# Patient Record
Sex: FEMALE | Race: WHITE | NOT HISPANIC OR LATINO | Employment: OTHER | URBAN - METROPOLITAN AREA
[De-identification: names, ages, dates, MRNs, and addresses within clinical notes are randomized per-mention and may not be internally consistent; named-entity substitution may affect disease eponyms.]

---

## 2022-07-25 ENCOUNTER — TELEMEDICINE (OUTPATIENT)
Dept: FAMILY MEDICINE CLINIC | Facility: CLINIC | Age: 72
End: 2022-07-25
Payer: COMMERCIAL

## 2022-07-25 VITALS — HEART RATE: 89 BPM | OXYGEN SATURATION: 98 % | TEMPERATURE: 99.2 F

## 2022-07-25 DIAGNOSIS — U07.1 COVID-19 VIRUS INFECTION: Primary | ICD-10-CM

## 2022-07-25 DIAGNOSIS — J01.90 ACUTE NON-RECURRENT SINUSITIS, UNSPECIFIED LOCATION: ICD-10-CM

## 2022-07-25 LAB
SARS-COV-2 AG UPPER RESP QL IA: POSITIVE
VALID CONTROL: ABNORMAL

## 2022-07-25 PROCEDURE — 87811 SARS-COV-2 COVID19 W/OPTIC: CPT | Performed by: NURSE PRACTITIONER

## 2022-07-25 PROCEDURE — 1160F RVW MEDS BY RX/DR IN RCRD: CPT | Performed by: NURSE PRACTITIONER

## 2022-07-25 PROCEDURE — 99203 OFFICE O/P NEW LOW 30 MIN: CPT | Performed by: NURSE PRACTITIONER

## 2022-07-25 NOTE — LETTER
July 25, 2022    Patient: Daisha Elaine  YOB: 1950  Date of Last Encounter: 7/25/2022      To whom it may concern:     Daisha Elaine has tested positive for COVID-19 (Coronavirus)  She may return to work on 7/27/2022 provided symptoms are improving and 24 hours without fever  Any questions please don't hesitate to call      Sincerely,         ROMY Orozco

## 2022-07-25 NOTE — ASSESSMENT & PLAN NOTE
Acute symptomatic started 7/21/2021 and not responding to conservative treatments  In office covid rapid test positive for covid  Will recommend increase fluids, covid vitamins, and otc tylenol prn

## 2022-07-25 NOTE — ASSESSMENT & PLAN NOTE
New diagnosis acute symptomatic positive for covid 7/25/2022  Patient has had covid vaccine series completed and started with symptoms 7/21/2022  Denies sob/chest pain  Patient reports improvement in symptoms since onset  Will recommend increase fluids, covid vitamins, otc tylenol prn  Educated on quarantine and strict masking guidelines and note provided for return to work  Educated to call with worsening of symptoms

## 2022-07-25 NOTE — PROGRESS NOTES
COVID-19 Outpatient Progress Note    Assessment/Plan:    Problem List Items Addressed This Visit        Respiratory    Acute non-recurrent sinusitis     Acute symptomatic started 7/21/2021 and not responding to conservative treatments  In office covid rapid test positive for covid  Will recommend increase fluids, covid vitamins, and otc tylenol prn  Relevant Orders    Poct Covid 19 Rapid Antigen Test (Completed)       Other    COVID-19 virus infection - Primary     New diagnosis acute symptomatic positive for covid 7/25/2022  Patient has had covid vaccine series completed and started with symptoms 7/21/2022  Denies sob/chest pain  Patient reports improvement in symptoms since onset  Will recommend increase fluids, covid vitamins, otc tylenol prn  Educated on quarantine and strict masking guidelines and note provided for return to work  Educated to call with worsening of symptoms  Disposition:     Rapid antigen testing was performed and the result is POSITIVE for COVID-19  I have spent 15 minutes directly with the patient  Greater than 50% of this time was spent in counseling/coordination of care regarding: instructions for management and patient and family education  Encounter provider ROMY Oleary    Provider located at [facility] AT [address] [id number] [phone]    Recent Visits  Date Type Provider Dept   07/25/22 Telemedicine [place] [address]  [facility] [place]   Showing recent visits within past 7 days and meeting all other requirements  Future Appointments  No visits were found meeting these conditions  Showing future appointments within next 150 days and meeting all other requirements     Subjective:   Geni Brannon is a 80 y o  female who is concerned about COVID-19   Patient's symptoms include fatigue, nasal congestion, rhinorrhea, sore throat, cough and myalgias  Patient denies fever, chills, malaise, anosmia, loss of taste, shortness of breath, chest tightness, abdominal pain, nausea, vomiting, diarrhea and headaches  - Date of symptom onset: 7/21/2022      COVID-19 vaccination status: Fully vaccinated (primary series)    Exposure:   Contact with a person who is under investigation (PUI) for or who is positive for COVID-19 within the last 14 days?: No    Hospitalized recently for fever and/or lower respiratory symptoms?: No      Currently a healthcare worker that is involved in direct patient care?: No      Works in a special setting where the risk of COVID-19 transmission may be high? (this may include long-term care, correctional and correction facilities; homeless shelters; assisted-living facilities and group homes ): No      Resident in a special setting where the risk of COVID-19 transmission may be high? (this may include long-term care, correctional and correction facilities; homeless shelters; assisted-living facilities and group homes ): No      Lab Results   Component Value Date    SARSCOVAG Positive (A) 07/25/2022     History reviewed  No pertinent past medical history  History reviewed  No pertinent surgical history  No current outpatient medications on file  No current facility-administered medications for this visit  Not on File    Review of Systems   Constitutional: Positive for activity change and fatigue  Negative for chills and fever  HENT: Positive for congestion, postnasal drip, rhinorrhea, sinus pressure, sinus pain and sore throat  Respiratory: Positive for cough  Negative for chest tightness and shortness of breath  Gastrointestinal: Negative for abdominal pain, diarrhea, nausea and vomiting  Musculoskeletal: Positive for myalgias  Neurological: Negative for headaches       Objective:    Vitals:    07/25/22 1330   Pulse: 89   Temp: 99 2 °F (37 3 °C)   SpO2: 98%       Physical Exam  Vitals and nursing note reviewed  Constitutional:       General: She is not in acute distress  Appearance: Normal appearance  She is ill-appearing  She is not toxic-appearing or diaphoretic  HENT:      Head: Normocephalic and atraumatic  Nose: No rhinorrhea  Eyes:      General: No scleral icterus  Right eye: No discharge  Left eye: No discharge  Conjunctiva/sclera: Conjunctivae normal    Cardiovascular:      Rate and Rhythm: Normal rate and regular rhythm  Pulmonary:      Effort: Pulmonary effort is normal  No respiratory distress  Breath sounds: No wheezing, rhonchi or rales  Musculoskeletal:         General: Normal range of motion  Cervical back: Normal range of motion  Skin:     Coloration: Skin is not jaundiced or pale  Findings: No bruising, erythema, lesion or rash  Neurological:      Mental Status: She is alert and oriented to person, place, and time  Psychiatric:         Mood and Affect: Mood normal          Behavior: Behavior normal          Thought Content: Thought content normal          Judgment: Judgment normal          VIRTUAL VISIT DISCLAIMER    Brandee Poe verbally agrees to participate in [employer]  Pt is aware that [employer] could be limited without vital signs or the ability to perform a full hands-on physical Hector Ortiz understands she or the provider may request at any time to terminate the video visit and request the patient to seek care or treatment in person

## 2022-10-11 PROBLEM — J01.90 ACUTE NON-RECURRENT SINUSITIS: Status: RESOLVED | Noted: 2022-07-25 | Resolved: 2022-10-11

## 2023-11-03 ENCOUNTER — VBI (OUTPATIENT)
Dept: ADMINISTRATIVE | Facility: OTHER | Age: 73
End: 2023-11-03

## 2024-09-10 ENCOUNTER — VBI (OUTPATIENT)
Dept: ADMINISTRATIVE | Facility: OTHER | Age: 74
End: 2024-09-10

## 2024-09-10 NOTE — TELEPHONE ENCOUNTER
09/10/24 12:34 PM     Chart reviewed for [facility]: Colonoscopy and Mammogram ; nothing is submitted to the patient's insurance at this time.     Sachin Gregory MA   [facility]